# Patient Record
Sex: FEMALE | Race: BLACK OR AFRICAN AMERICAN | Employment: UNEMPLOYED | URBAN - METROPOLITAN AREA
[De-identification: names, ages, dates, MRNs, and addresses within clinical notes are randomized per-mention and may not be internally consistent; named-entity substitution may affect disease eponyms.]

---

## 2017-11-20 ENCOUNTER — OFFICE VISIT (OUTPATIENT)
Dept: INTERNAL MEDICINE CLINIC | Age: 57
End: 2017-11-20

## 2017-11-20 VITALS
HEART RATE: 85 BPM | TEMPERATURE: 98.5 F | DIASTOLIC BLOOD PRESSURE: 46 MMHG | SYSTOLIC BLOOD PRESSURE: 145 MMHG | RESPIRATION RATE: 16 BRPM | OXYGEN SATURATION: 97 %

## 2017-11-20 DIAGNOSIS — M79.7 FIBROMYALGIA: ICD-10-CM

## 2017-11-20 DIAGNOSIS — Z12.31 ENCOUNTER FOR SCREENING MAMMOGRAM FOR HIGH-RISK PATIENT: ICD-10-CM

## 2017-11-20 DIAGNOSIS — F31.9 BIPOLAR 1 DISORDER (HCC): ICD-10-CM

## 2017-11-20 DIAGNOSIS — M75.01 ADHESIVE CAPSULITIS OF RIGHT SHOULDER: Primary | ICD-10-CM

## 2017-11-20 DIAGNOSIS — I10 ESSENTIAL HYPERTENSION: ICD-10-CM

## 2017-11-20 DIAGNOSIS — Z00.00 PHYSICAL EXAM: ICD-10-CM

## 2017-11-20 DIAGNOSIS — K58.0 IRRITABLE BOWEL SYNDROME WITH DIARRHEA: ICD-10-CM

## 2017-11-20 RX ORDER — CHOLECALCIFEROL (VITAMIN D3) 125 MCG
220 CAPSULE ORAL AS NEEDED
COMMUNITY
End: 2017-11-26 | Stop reason: CLARIF

## 2017-11-20 RX ORDER — ALBUTEROL SULFATE 90 UG/1
2 AEROSOL, METERED RESPIRATORY (INHALATION)
COMMUNITY

## 2017-11-20 RX ORDER — MONTELUKAST SODIUM 10 MG/1
10 TABLET ORAL DAILY
COMMUNITY

## 2017-11-20 RX ORDER — AZELASTINE HCL 205.5 UG/1
SPRAY NASAL 2 TIMES DAILY
COMMUNITY

## 2017-11-20 RX ORDER — RANITIDINE 150 MG/1
150 TABLET, FILM COATED ORAL
COMMUNITY

## 2017-11-20 RX ORDER — GUAIFENESIN 100 MG/5ML
81 LIQUID (ML) ORAL DAILY
COMMUNITY

## 2017-11-20 RX ORDER — DICYCLOMINE HYDROCHLORIDE 10 MG/1
10 CAPSULE ORAL
COMMUNITY
End: 2018-01-23 | Stop reason: SDUPTHER

## 2017-11-20 RX ORDER — IBUPROFEN 800 MG/1
800 TABLET ORAL AS NEEDED
COMMUNITY
End: 2017-11-26 | Stop reason: CLARIF

## 2017-11-20 RX ORDER — BISMUTH SUBSALICYLATE 262 MG
1 TABLET,CHEWABLE ORAL DAILY
COMMUNITY

## 2017-11-20 RX ORDER — CHOLECALCIFEROL (VITAMIN D3) 125 MCG
CAPSULE ORAL
COMMUNITY

## 2017-11-20 RX ORDER — OMEPRAZOLE 40 MG/1
40 CAPSULE, DELAYED RELEASE ORAL DAILY
COMMUNITY

## 2017-11-20 NOTE — PROGRESS NOTES
Chief Complaint   Patient presents with    New Patient     patient refused weight and refused to discuss reason for visit. 1. Have you been to the ER, urgent care clinic since your last visit? Hospitalized since your last visit? No    2. Have you seen or consulted any other health care providers outside of the [address] [place] [address] since your last visit? Include any pap smears or colon screening.  Yes Where: patient from [place]

## 2017-11-20 NOTE — MR AVS SNAPSHOT
Visit Information Date & Time Provider Department Dept. Phone Encounter #  
 11/20/2017 11:30 AM Jese Owusu MD [facility] [phone] [id number] Upcoming Health Maintenance Date Due Hepatitis C Screening 1960 BREAST CANCER SCRN MAMMOGRAM 4/22/2010 FOBT Q 1 YEAR AGE 50-75 4/22/2010 DTaP/Tdap/Td series (2 - Td) 11/20/2027 Allergies as of 11/20/2017  Review Complete On: 11/20/2017 By: Ekaterina Garcia No Known Allergies Current Immunizations  Never Reviewed No immunizations on file. Not reviewed this visit You Were Diagnosed With   
  
 Codes Comments Adhesive capsulitis of right shoulder    -  Primary ICD-10-CM: M75.01 
ICD-9-CM: 726.0 Irritable bowel syndrome with diarrhea     ICD-10-CM: K58.0 ICD-9-CM: 048.5 Fibromyalgia     ICD-10-CM: M79.7 ICD-9-CM: 729.1 Essential hypertension     ICD-10-CM: I10 
ICD-9-CM: 401.9 Physical exam     ICD-10-CM: Z00.00 ICD-9-CM: V70.9 Vitals BP Pulse Temp Resp LMP SpO2  
 145/46 (BP 1 Location: Left arm, BP Patient Position: Sitting) 85 98.5 °F (36.9 °C) (Oral) 16 (Exact Date) 97% OB Status Smoking Status Hysterectomy Current Every Day Smoker Vitals History Preferred Pharmacy Pharmacy Name Phone [facility]/PH[facility]ACY #[id number] [phone] Your Updated Medication List  
  
   
This list is accurate as of: 11/20/17  2:39 PM.  Always use your most recent med list.  
  
  
  
  
 ALEVE 220 mg Cap Generic drug:  naproxen sodium Take 220 mg by mouth as needed. AMLODIPINE BESYLATE (BULK) 10 mg by Does Not Apply route daily. Azelastine 0.15 % (205.5 mcg) nasal spray Commonly known as:  ASTEPRO  
two (2) times a day. STEVE CHEWABLE ASPIRIN 81 mg chewable tablet Generic drug:  aspirin Take 81 mg by mouth daily. dicyclomine 10 mg capsule Commonly known as:  BENTYL Take 10 mg by mouth every seven (7) days. DULOXETINE HCL (BULK)  
by Does Not Apply route. GARCINIA CAMBOGIA PO Take 1 Tab by mouth daily. ibuprofen 800 mg tablet Commonly known as:  MOTRIN Take 800 mg by mouth as needed for Pain.  
  
 multivitamin tablet Commonly known as:  ONE A DAY Take 1 Tab by mouth daily. omeprazole 40 mg capsule Commonly known as:  PRILOSEC Take 40 mg by mouth daily. raNITIdine 150 mg tablet Commonly known as:  ZANTAC Take 150 mg by mouth daily as needed for Indigestion. SINGULAIR 10 mg tablet Generic drug:  montelukast  
Take 10 mg by mouth daily. valsartan 320 mg tab 320 mg, hydroCHLOROthiazide 25 mg tab 25 mg Take 1 Dose by mouth daily. VENTOLIN HFA 90 mcg/actuation inhaler Generic drug:  albuterol Take 2 Puffs by inhalation every four (4) hours as needed for Wheezing. VITAMIN D3 2,000 unit Tab Generic drug:  cholecalciferol (vitamin D3) Take  by mouth. zinc 50 mg Tab tablet Take  by mouth daily. We Performed the Following APOLIPOPROTEIN B [id number] CPT(R)] CBC WITH AUTOMATED DIFF [17150 CPT(R)] HEPATITIS C AB [30494 CPT(R)] LIPID PANEL [74140 CPT(R)] METABOLIC PANEL, COMPREHENSIVE [71689 CPT(R)] OCCULT BLOOD, IMMUNOASSAY (FIT) Q0099719 CPT(R)] IL COLLECTION VENOUS BLOOD,VENIPUNCTURE [id number] CPT(R)] TSH 3RD GENERATION [78214 CPT(R)] URINALYSIS W/ RFLX MICROSCOPIC [91381 CPT(R)] Introducing [facility] & HEALTH SERVICES! Ivon Salcedo introduces [employer] patient portal. Now you can access parts of your medical record, email your doctor's office, and request medication refills online. 1. In your internet browser, go to [email or web address] 2. Click on the First Time User? Click Here link in the Sign In box. You will see the New Member Sign Up page. 3. Enter your [employer] Access Code exactly as it appears below. You will not need to use this code after youve completed the sign-up process. If you do not sign up before the expiration date, you must request a new code. · [employer] Access Code: [email or web address] Expires: 2/18/2018  2:39 PM 
 
4. Enter the last four digits of your Social Security Number (xxxx) and Date of Birth (mm/dd/yyyy) as indicated and click Submit. You will be taken to the next sign-up page. 5. Create a [employer] ID. This will be your [employer] login ID and cannot be changed, so think of one that is secure and easy to remember. 6. Create a [employer] password. You can change your password at any time. 7. Enter your Password Reset Question and Answer. This can be used at a later time if you forget your password. 8. Enter your e-mail address. You will receive e-mail notification when new information is available in [address] 9. Click Sign Up. You can now view and download portions of your medical record. 10. Click the Download Summary menu link to download a portable copy of your medical information. If you have questions, please visit the Frequently Asked Questions section of the [employer] website. Remember, [employer] is NOT to be used for urgent needs. For medical emergencies, dial 911. Now available from your iPhone and Android! Please provide this summary of care documentation to your next provider. If you have any questions after today's visit, please call [phone].

## 2017-11-21 LAB
ALBUMIN SERPL-MCNC: 4.3 G/DL (ref 3.5–5.5)
ALBUMIN/GLOB SERPL: 1.6 {RATIO} (ref 1.2–2.2)
ALP SERPL-CCNC: 70 IU/L (ref 39–117)
ALT SERPL-CCNC: 11 IU/L (ref 0–32)
APO B SERPL-MCNC: 96 MG/DL (ref 54–133)
APPEARANCE UR: CLEAR
AST SERPL-CCNC: 17 IU/L (ref 0–40)
BASOPHILS # BLD AUTO: 0.1 X10E3/UL (ref 0–0.2)
BASOPHILS NFR BLD AUTO: 1 %
BILIRUB SERPL-MCNC: 0.3 MG/DL (ref 0–1.2)
BILIRUB UR QL STRIP: NEGATIVE
BUN SERPL-MCNC: 15 MG/DL (ref 6–24)
BUN/CREAT SERPL: 26 (ref 9–23)
CALCIUM SERPL-MCNC: 9.6 MG/DL (ref 8.7–10.2)
CHLORIDE SERPL-SCNC: 101 MMOL/L (ref 96–106)
CHOLEST SERPL-MCNC: 180 MG/DL (ref 100–199)
CO2 SERPL-SCNC: 25 MMOL/L (ref 18–29)
COLOR UR: YELLOW
CREAT SERPL-MCNC: 0.58 MG/DL (ref 0.57–1)
EOSINOPHIL # BLD AUTO: 0.1 X10E3/UL (ref 0–0.4)
EOSINOPHIL NFR BLD AUTO: 2 %
ERYTHROCYTE [DISTWIDTH] IN BLOOD BY AUTOMATED COUNT: 14.3 % (ref 12.3–15.4)
GFR SERPLBLD CREATININE-BSD FMLA CKD-EPI: 103 ML/MIN/1.73
GFR SERPLBLD CREATININE-BSD FMLA CKD-EPI: 118 ML/MIN/1.73
GLOBULIN SER CALC-MCNC: 2.7 G/DL (ref 1.5–4.5)
GLUCOSE SERPL-MCNC: 99 MG/DL (ref 65–99)
GLUCOSE UR QL: NEGATIVE
HCT VFR BLD AUTO: 35.3 % (ref 34–46.6)
HCV AB S/CO SERPL IA: <0.1 S/CO RATIO (ref 0–0.9)
HDLC SERPL-MCNC: 49 MG/DL
HGB BLD-MCNC: 11.2 G/DL (ref 11.1–15.9)
HGB UR QL STRIP: NEGATIVE
IMM GRANULOCYTES # BLD: 0 X10E3/UL (ref 0–0.1)
IMM GRANULOCYTES NFR BLD: 0 %
KETONES UR QL STRIP: NEGATIVE
LDLC SERPL CALC-MCNC: 101 MG/DL (ref 0–99)
LEUKOCYTE ESTERASE UR QL STRIP: NEGATIVE
LYMPHOCYTES # BLD AUTO: 2.6 X10E3/UL (ref 0.7–3.1)
LYMPHOCYTES NFR BLD AUTO: 42 %
MCH RBC QN AUTO: 28.9 PG (ref 26.6–33)
MCHC RBC AUTO-ENTMCNC: 31.7 G/DL (ref 31.5–35.7)
MCV RBC AUTO: 91 FL (ref 79–97)
MICRO URNS: NORMAL
MONOCYTES # BLD AUTO: 0.4 X10E3/UL (ref 0.1–0.9)
MONOCYTES NFR BLD AUTO: 6 %
NEUTROPHILS # BLD AUTO: 3 X10E3/UL (ref 1.4–7)
NEUTROPHILS NFR BLD AUTO: 49 %
NITRITE UR QL STRIP: NEGATIVE
PH UR STRIP: 6 [PH] (ref 5–7.5)
PLATELET # BLD AUTO: 374 X10E3/UL (ref 150–379)
POTASSIUM SERPL-SCNC: 4.5 MMOL/L (ref 3.5–5.2)
PROT SERPL-MCNC: 7 G/DL (ref 6–8.5)
PROT UR QL STRIP: NEGATIVE
RBC # BLD AUTO: 3.88 X10E6/UL (ref 3.77–5.28)
SODIUM SERPL-SCNC: 141 MMOL/L (ref 134–144)
SP GR UR: 1.03 (ref 1–1.03)
TRIGL SERPL-MCNC: 152 MG/DL (ref 0–149)
TSH SERPL DL<=0.005 MIU/L-ACNC: 1.16 UIU/ML (ref 0.45–4.5)
UROBILINOGEN UR STRIP-MCNC: 0.2 MG/DL (ref 0.2–1)
VLDLC SERPL CALC-MCNC: 30 MG/DL (ref 5–40)
WBC # BLD AUTO: 6.1 X10E3/UL (ref 3.4–10.8)

## 2017-11-26 PROBLEM — F31.9 BIPOLAR 1 DISORDER (HCC): Status: ACTIVE | Noted: 2017-11-26

## 2017-11-26 RX ORDER — MELOXICAM 15 MG/1
15 TABLET ORAL DAILY
Qty: 30 TAB | Refills: 1 | Status: SHIPPED | OUTPATIENT
Start: 2017-11-26

## 2017-11-26 NOTE — PROGRESS NOTES
[facility]  [place] [address]  [address] [place] [address]  Phone:  [phone]  Fax: [phone]       Chief Complaint   Patient presents with    New Patient     patient refused weight and refused to discuss reason for visit. .      SUBJECTIVE:    Fatmata Pelayo is a 62 y.o. female Comes in as a new patient. Her first complaint is that of pain in her right shoulder present since August of this year. This is aggravated with movement. There is no history of trauma. She has a history of hypertension for the last six years. She states that she is disabled as a direct result of irritable bowel syndrome. She has seen multiple gastroenterologists where she formerly lived. There is a history of fibromyalgia. She also states that she has a bipolar disorder and is seeing a psychiatrist.      She most recently moved to [place]. Current Outpatient Prescriptions   Medication Sig Dispense Refill    meloxicam (MOBIC) 15 mg tablet Take 1 Tab by mouth daily. 30 Tab 1    albuterol (VENTOLIN HFA) 90 mcg/actuation inhaler Take 2 Puffs by inhalation every four (4) hours as needed for Wheezing.  Azelastine (ASTEPRO) 0.15 % (205.5 mcg) nasal spray two (2) times a day.  zinc 50 mg tab tablet Take  by mouth daily.  cholecalciferol, vitamin D3, (VITAMIN D3) 2,000 unit tab Take  by mouth.  multivitamin (ONE A DAY) tablet Take 1 Tab by mouth daily.  dicyclomine (BENTYL) 10 mg capsule Take 10 mg by mouth every seven (7) days.  DULOXETINE HCL, BULK, by Does Not Apply route.  montelukast (SINGULAIR) 10 mg tablet Take 10 mg by mouth daily.  CHROM RAJEEV/BRINDAL BERRY (GARCINIA CAMBOGIA PO) Take 1 Tab by mouth daily.  aspirin (STEVE CHEWABLE ASPIRIN) 81 mg chewable tablet Take 81 mg by mouth daily.  raNITIdine (ZANTAC) 150 mg tablet Take 150 mg by mouth daily as needed for Indigestion.       omeprazole (PRILOSEC) 40 mg capsule Take 40 mg by mouth daily.  valsartan 320 mg tab 320 mg, hydroCHLOROthiazide 25 mg tab 25 mg Take 1 Dose by mouth daily.  AMLODIPINE BESYLATE, BULK, 10 mg by Does Not Apply route daily.        Past Medical History:   Diagnosis Date    Asthma     B[facility] depression ([facility]     Fibromyalgia     Hypertension     Diagnosed at age 46    IBS (irritable bowel syndrome)     Rheumatoid arthritis ([facility]      Past Surgical History:   Procedure Laterality Date    HX  SECTION      HX COLONOSCOPY      ×3 with the most recent one being less than 2 years ago    HX JYOTI AND BSO       No Known Allergies      REVIEW OF SYSTEMS:  General: negative for - chills or fever  ENT: negative for - headaches, nasal congestion or tinnitus  Respiratory: negative for - cough, hemoptysis, shortness of breath or wheezing  Cardiovascular : negative for - chest pain, edema, palpitations or shortness of breath  Gastrointestinal: negative for - abdominal pain, blood in stools, heartburn or nausea/vomiting  Genito-Urinary: no dysuria, trouble voiding, or hematuria  Musculoskeletal: negative for - gait disturbance, joint pain, joint stiffness or joint swelling  Neurological: no TIA or stroke symptoms  Hematologic: no bruises, no bleeding, no swollen glands  Integument: no lumps, mole changes, nail changes or rash  Endocrine: no malaise/lethargy or unexpected weight changes      Social History     Social History    Marital status: UNKNOWN     Spouse name: N/A    Number of children: N/A    Years of education: N/A     Social History Main Topics    Smoking status: Current Every Day Smoker    Smokeless tobacco: Never Used      Comment: \"smokes cigarettes per day\"    Alcohol use Yes    Drug use: Yes     Special: Marijuana    Sexual activity: No     Other Topics Concern    None     Social History Narrative     Family History   Problem Relation Age of Onset    Hypertension Mother     Hypertension Father     Cancer Father      History of multiple myeloma    Diabetes Sister        OBJECTIVE:    Visit Vitals    /46 (BP 1 Location: Left arm, BP Patient Position: Sitting)    Pulse 85    Temp 98.5 °F (36.9 °C) (Oral)    Resp 16    LMP  (Exact Date)  Comment: 2008    SpO2 97%     CONSTITUTIONAL: well , well nourished, appears age appropriate  EYES: perrla, eom intact  ENMT:moist mucous membranes, pharynx clear  NECK: supple. Thyroid normal  RESPIRATORY: Chest: clear to ascultation and percussion   CARDIOVASCULAR: Heart: regular rate and rhythm  GASTROINTESTINAL: Abdomen: soft, bowel sounds active  HEMATOLOGIC: no pathological lymph nodes palpated  MUSCULOSKELETAL: Extremities: no edema, pulse 1+   INTEGUMENT: No unusual rashes or suspicious skin lesions noted. Nails appear normal.  NEUROLOGIC: non-focal exam   MENTAL STATUS: alert and oriented, appropriate affect      ASSESSMENT:  1. Adhesive capsulitis of right shoulder    2. Irritable bowel syndrome with diarrhea    3. Fibromyalgia    4. Essential hypertension    5. Encounter for screening mammogram for high-risk patient    6. Bipolar 1 disorder ([facility]    7. Physical exam        PLAN:    1. The patient appears to have a pericapsulitis of her right shoulder. I suggested NSAID usage chronically at least for the next month. If no improvement, she will have to see an orthopaedic physician where an injection of the shoulder would be in order. 2. She has irritable bowel syndrome and needs to follow-up with a gastroenterologist.   3. The fibromyalgia appears to be reasonably stable for now. 4. Blood pressure is 144/78, no adjustments are made for now but will be done on a return visit if this persists. 5. As far as her bipolar disorder is concerned, follow-up with psychiatrist is indicated. 6. Mammograms will be scheduled.         .  Orders Placed This Encounter    NUNO MAMMO BI SCREENING INCL CAD    HEPATITIS C AB    OCCULT BLOOD, IMMUNOASSAY (FIT)    APOLIPOPROTEIN B    CBC WITH AUTOMATED DIFF    LIPID PANEL    URINALYSIS W/ RFLX MICROSCOPIC    TSH 3RD GENERATION    METABOLIC PANEL, COMPREHENSIVE    albuterol (VENTOLIN HFA) 90 mcg/actuation inhaler    Azelastine (ASTEPRO) 0.15 % (205.5 mcg) nasal spray    zinc 50 mg tab tablet    cholecalciferol, vitamin D3, (VITAMIN D3) 2,000 unit tab    multivitamin (ONE A DAY) tablet    dicyclomine (BENTYL) 10 mg capsule    DULOXETINE HCL, BULK,    montelukast (SINGULAIR) 10 mg tablet    CHROM RAJEEV/BRINDAL BERRY (GARCINIA CAMBOGIA PO)    aspirin (STEVE CHEWABLE ASPIRIN) 81 mg chewable tablet    DISCONTD: naproxen sodium (ALEVE) 220 mg cap    DISCONTD: ibuprofen (MOTRIN) 800 mg tablet    raNITIdine (ZANTAC) 150 mg tablet    omeprazole (PRILOSEC) 40 mg capsule    valsartan 320 mg tab 320 mg, hydroCHLOROthiazide 25 mg tab 25 mg    AMLODIPINE BESYLATE, BULK,    meloxicam (MOBIC) 15 mg tablet         Follow-up Disposition:  Return in about 4 weeks (around 12/18/2017).       Gabby Cisneros MD

## 2017-12-04 RX ORDER — VALSARTAN AND HYDROCHLOROTHIAZIDE 320; 25 MG/1; MG/1
1 TABLET, FILM COATED ORAL DAILY
Qty: 30 TAB | Refills: 11 | Status: SHIPPED | OUTPATIENT
Start: 2017-12-04

## 2017-12-04 RX ORDER — AMLODIPINE BESYLATE 10 MG/1
10 TABLET ORAL DAILY
Qty: 30 TAB | Refills: 11 | Status: SHIPPED | OUTPATIENT
Start: 2017-12-04

## 2017-12-12 LAB — HEMOCCULT STL QL IA: NORMAL

## 2017-12-20 ENCOUNTER — HOSPITAL ENCOUNTER (OUTPATIENT)
Dept: MAMMOGRAPHY | Age: 57
Discharge: HOME | End: 2017-12-20
Payer: MEDICAID

## 2017-12-20 DIAGNOSIS — Z12.31 ENCOUNTER FOR SCREENING MAMMOGRAM FOR HIGH-RISK PATIENT: ICD-10-CM

## 2017-12-20 PROCEDURE — 77067 SCR MAMMO BI INCL CAD: CPT

## 2018-01-09 ENCOUNTER — OFFICE VISIT (OUTPATIENT)
Dept: INTERNAL MEDICINE CLINIC | Age: 58
End: 2018-01-09

## 2018-01-09 VITALS
OXYGEN SATURATION: 95 % | BODY MASS INDEX: 27.36 KG/M2 | RESPIRATION RATE: 16 BRPM | DIASTOLIC BLOOD PRESSURE: 83 MMHG | SYSTOLIC BLOOD PRESSURE: 146 MMHG | TEMPERATURE: 98.5 F | WEIGHT: 174.3 LBS | HEART RATE: 92 BPM | HEIGHT: 67 IN

## 2018-01-09 DIAGNOSIS — M75.01 ADHESIVE CAPSULITIS OF RIGHT SHOULDER: ICD-10-CM

## 2018-01-09 DIAGNOSIS — I10 ESSENTIAL HYPERTENSION: ICD-10-CM

## 2018-01-09 DIAGNOSIS — Z72.0 TOBACCO ABUSE: ICD-10-CM

## 2018-01-09 DIAGNOSIS — F31.9 BIPOLAR 1 DISORDER (HCC): ICD-10-CM

## 2018-01-09 DIAGNOSIS — M79.7 FIBROMYALGIA: ICD-10-CM

## 2018-01-09 DIAGNOSIS — K58.0 IRRITABLE BOWEL SYNDROME WITH DIARRHEA: Primary | ICD-10-CM

## 2018-01-09 NOTE — PROGRESS NOTES
Chief Complaint   Patient presents with    Irritable Bowel Syndrome     1 month follow up      1. Have you been to the ER, urgent care clinic since your last visit? Hospitalized since your last visit? No    2. Have you seen or consulted any other health care providers outside of the [address] [place] [address] since your last visit? Include any pap smears or colon screening.  No

## 2018-01-09 NOTE — MR AVS SNAPSHOT
Visit Information Date & Time Provider Department Dept. Phone Encounter #  
 1/9/2018  3:30 PM [facility] [phone] [id number] Upcoming Health Maintenance Date Due FOBT Q 1 YEAR AGE 50-75 11/20/2018 BREAST CANCER SCRN MAMMOGRAM 11/26/2019 DTaP/Tdap/Td series (2 - Td) 11/20/2027 Allergies as of 1/9/2018  Review Complete On: 1/9/2018 By: Rubi Combs No Known Allergies Current Immunizations  Never Reviewed No immunizations on file. Not reviewed this visit You Were Diagnosed With   
  
 Codes Comments Irritable bowel syndrome with diarrhea    -  Primary ICD-10-CM: K58.0 ICD-9-CM: 253.8 Essential hypertension     ICD-10-CM: I10 
ICD-9-CM: 401.9 Bipolar 1 disorder (HCC)     ICD-10-CM: F31.9 ICD-9-CM: 296.7 Tobacco abuse     ICD-10-CM: Z72.0 ICD-9-CM: 305.1 Adhesive capsulitis of right shoulder     ICD-10-CM: M75.01 
ICD-9-CM: 726.0 Vitals BP Pulse Temp Resp Height(growth percentile) Weight(growth percentile) 146/83 (BP 1 Location: Left arm, BP Patient Position: Sitting) 92 98.5 °F (36.9 °C) (Oral) 16 5' 6.5\" (1.689 m) 174 lb 4.8 oz (79.1 kg) SpO2 BMI OB Status Smoking Status 95% 27.71 kg/m2 Hysterectomy Current Every Day Smoker Vitals History BMI and BSA Data Body Mass Index Body Surface Area  
 27.71 kg/m 2 1.93 m 2 Preferred Pharmacy Pharmacy Name Phone [facility]/PHARMACY #[id number] [address] [phone] Your Updated Medication List  
  
   
This list is accurate as of: 1/9/18  4:09 PM.  Always use your most recent med list. amLODIPine 10 mg tablet Commonly known as:  Ellie Martin Take 1 Tab by mouth daily. Azelastine 0.15 % (205.5 mcg) nasal spray Commonly known as:  ASTEPRO  
two (2) times a day. STEVE CHEWABLE ASPIRIN 81 mg chewable tablet Generic drug:  aspirin Take 81 mg by mouth daily. dicyclomine 10 mg capsule Commonly known as:  BENTYL Take 10 mg by mouth every seven (7) days. DULOXETINE HCL (BULK)  
by Does Not Apply route. GARCINIA CAMBOGIA PO Take 1 Tab by mouth daily. meloxicam 15 mg tablet Commonly known as:  MOBIC Take 1 Tab by mouth daily. multivitamin tablet Commonly known as:  ONE A DAY Take 1 Tab by mouth daily. omeprazole 40 mg capsule Commonly known as:  PRILOSEC Take 40 mg by mouth daily. raNITIdine 150 mg tablet Commonly known as:  ZANTAC Take 150 mg by mouth daily as needed for Indigestion. SINGULAIR 10 mg tablet Generic drug:  montelukast  
Take 10 mg by mouth daily. valsartan-hydroCHLOROthiazide 320-25 mg per tablet Commonly known as:  DIOVAN-HCT Take 1 Tab by mouth daily. VENTOLIN HFA 90 mcg/actuation inhaler Generic drug:  albuterol Take 2 Puffs by inhalation every four (4) hours as needed for Wheezing. VITAMIN D3 2,000 unit Tab Generic drug:  cholecalciferol (vitamin D3) Take  by mouth. zinc 50 mg Tab tablet Take  by mouth daily. Introducing [facility] & HEALTH [facility]! Cleo Bennett introduces [employer] patient portal. Now you can access parts of your medical record, email your doctor's office, and request medication refills online. 1. In your internet browser, go to [email or web address] 2. Click on the First Time User? Click Here link in the Sign In box. You will see the New Member Sign Up page. 3. Enter your [employer] Access Code exactly as it appears below. You will not need to use this code after youve completed the sign-up process. If you do not sign up before the expiration date, you must request a new code. · [employer] Access Code: [email or web address] Expires: 2/18/2018  2:39 PM 
 
 4. Enter the last four digits of your Social Security Number (xxxx) and Date of Birth (mm/dd/yyyy) as indicated and click Submit. You will be taken to the next sign-up page. 5. Create a [employer] ID. This will be your [employer] login ID and cannot be changed, so think of one that is secure and easy to remember. 6. Create a [employer] password. You can change your password at any time. 7. Enter your Password Reset Question and Answer. This can be used at a later time if you forget your password. 8. Enter your e-mail address. You will receive e-mail notification when new information is available in [address] 9. Click Sign Up. You can now view and download portions of your medical record. 10. Click the Download Summary menu link to download a portable copy of your medical information. If you have questions, please visit the Frequently Asked Questions section of the [employer] website. Remember, [employer] is NOT to be used for urgent needs. For medical emergencies, dial 911. Now available from your iPhone and Android! Please provide this summary of care documentation to your next provider. Your primary care clinician is listed as Jimenez Cordova. If you have any questions after today's visit, please call [phone].

## 2018-01-10 ENCOUNTER — TELEPHONE (OUTPATIENT)
Dept: INTERNAL MEDICINE CLINIC | Age: 58
End: 2018-01-10

## 2018-01-10 NOTE — TELEPHONE ENCOUNTER
Spoke with patient to inform her that Dr. Svetlana Metz recommends that she take both blood pressure medications. Patient verbalized understanding. I told patient that she could give the office a call if she has any questions.

## 2018-01-10 NOTE — PROGRESS NOTES
[facility]  [facility]  [address] [place] [address]  Phone:  [phone]  Fax: [phone]       Chief Complaint   Patient presents with    Irritable Bowel Syndrome     1 month follow up    . SUBJECTIVE:    Arabella christensen a 62 y.o. female comes in for return visit stating that her right shoulder is somewhat better. I gave her Meloxicam on the last visit. This appears to have helped although not completely. She is more comfortable. The sensation is aggravated with certain positions. She continues to have fibromyalgia but this is not disabling. She does not really want any analgesics. She has not been consistently taking her antihypertensive medication on a regular basis. She has IBS with diarrhea. This is rather disabling. She would like to see a psychiatrist also for her bipolar disorder. Unfortunately, she continues to smoke cigarettes. Current Outpatient Prescriptions   Medication Sig Dispense Refill    eluxadoline (VIBERZI) 100 mg tablet Take 1 Tab by mouth two (2) times daily (with meals). Max Daily Amount: 200 mg. 60 Tab 11    valsartan-hydroCHLOROthiazide (DIOVAN-HCT) 320-25 mg per tablet Take 1 Tab by mouth daily. 30 Tab 11    amLODIPine (NORVASC) 10 mg tablet Take 1 Tab by mouth daily. 30 Tab 11    meloxicam (MOBIC) 15 mg tablet Take 1 Tab by mouth daily. 30 Tab 1    albuterol (VENTOLIN HFA) 90 mcg/actuation inhaler Take 2 Puffs by inhalation every four (4) hours as needed for Wheezing.  Azelastine (ASTEPRO) 0.15 % (205.5 mcg) nasal spray two (2) times a day.  zinc 50 mg tab tablet Take  by mouth daily.  cholecalciferol, vitamin D3, (VITAMIN D3) 2,000 unit tab Take  by mouth.  multivitamin (ONE A DAY) tablet Take 1 Tab by mouth daily.  dicyclomine (BENTYL) 10 mg capsule Take 10 mg by mouth every seven (7) days.  DULOXETINE HCL, BULK, by Does Not Apply route.       montelukast (SINGULAIR) 10 mg tablet Take 10 mg by mouth daily.  CHROM RAJEEV/BRINDAL BERRY (GARCINIA CAMBOGIA PO) Take 1 Tab by mouth daily.  aspirin (STEVE CHEWABLE ASPIRIN) 81 mg chewable tablet Take 81 mg by mouth daily.  raNITIdine (ZANTAC) 150 mg tablet Take 150 mg by mouth daily as needed for Indigestion.  omeprazole (PRILOSEC) 40 mg capsule Take 40 mg by mouth daily.        Past Medical History:   Diagnosis Date    Asthma     Bipolar depression ([facility]     Fibromyalgia     Hypertension     Diagnosed at age 46    IBS (irritable bowel syndrome)     Rheumatoid arthritis ([facility]      Past Surgical History:   Procedure Laterality Date    HX  SECTION      HX COLONOSCOPY      ×3 with the most recent one being less than 2 years ago    HX JYOTI AND BSO       No Known Allergies      REVIEW OF SYSTEMS:  General: negative for - chills or fever  ENT: negative for - headaches, nasal congestion or tinnitus  Respiratory: negative for - cough, hemoptysis, shortness of breath or wheezing  Cardiovascular : negative for - chest pain, edema, palpitations or shortness of breath  Gastrointestinal: negative for - abdominal pain, blood in stools, heartburn or nausea/vomiting  Genito-Urinary: no dysuria, trouble voiding, or hematuria  Musculoskeletal: negative for - gait disturbance, joint pain, joint stiffness or joint swelling  Neurological: no TIA or stroke symptoms  Hematologic: no bruises, no bleeding, no swollen glands  Integument: no lumps, mole changes, nail changes or rash  Endocrine: no malaise/lethargy or unexpected weight changes      Social History     Social History    Marital status: UNKNOWN     Spouse name: N/A    Number of children: N/A    Years of education: N/A     Social History Main Topics    Smoking status: Current Every Day Smoker    Smokeless tobacco: Never Used      Comment: \"smokes cigarettes per day\"    Alcohol use Yes    Drug use: Yes     Special: Marijuana    Sexual activity: No     Other Topics Concern    None     Social History Narrative     Family History   Problem Relation Age of Onset    Hypertension Mother     Hypertension Father     Cancer Father      History of multiple myeloma    Diabetes Sister        OBJECTIVE:    Visit Vitals    /83 (BP 1 Location: Left arm, BP Patient Position: Sitting)    Pulse 92    Temp 98.5 °F (36.9 °C) (Oral)    Resp 16    Ht 5' 6.5\" (1.689 m)    Wt 174 lb 4.8 oz (79.1 kg)    SpO2 95%    BMI 27.71 kg/m2     CONSTITUTIONAL: well , well nourished, appears age appropriate  EYES: perrla, eom intact  ENMT:moist mucous membranes, pharynx clear  NECK: supple. Thyroid normal  RESPIRATORY: Chest: clear to ascultation and percussion   CARDIOVASCULAR: Heart: regular rate and rhythm  GASTROINTESTINAL: Abdomen: soft, bowel sounds active  HEMATOLOGIC: no pathological lymph nodes palpated  MUSCULOSKELETAL: Extremities: no edema, pulse 1+   INTEGUMENT: No unusual rashes or suspicious skin lesions noted. Nails appear normal.  NEUROLOGIC: non-focal exam   MENTAL STATUS: alert and oriented, appropriate affect      ASSESSMENT:  1. Irritable bowel syndrome with diarrhea    2. Essential hypertension    3. Bipolar 1 disorder ([facility]    4. Tobacco abuse    5. Adhesive capsulitis of right shoulder    6. Fibromyalgia      Diagnoses and all orders for this visit:    1. Irritable bowel syndrome with diarrhea  -     eluxadoline (VIBERZI) 100 mg tablet; Take 1 Tab by mouth two (2) times daily (with meals). Max Daily Amount: 200 mg.    2. Essential hypertension    3. Bipolar 1 disorder [facility])  Assessment & Plan: This condition is managed by Specialist.  Key Psychotherapeutic Meds     Patient is on no psychotherapeutic meds. Other [address] [place] [address]     The patient is on no other behavioral health meds.         Lab Results   Component Value Date/Time    Sodium 141 11/20/2017 02:25 PM    Creatinine 0.58 11/20/2017 02:25 PM    TSH 1.160 11/20/2017 02:25 PM WBC 6.1 11/20/2017 02:25 PM    ALT (SGPT) 11 11/20/2017 02:25 PM    AST (SGOT) 17 11/20/2017 02:25 PM       Orders:  -     REFERRAL TO PSYCHIATRY    4. Tobacco abuse    5. Adhesive capsulitis of right shoulder    6. Fibromyalgia        PLAN:    1. As far as her irritable bowel syndrome is concerned, I will start her on Viberzi. This should also help with the diarrhea. If no improvement, she will have to see a gastroenterologist.   2. Her blood pressure is elevated. She was placed on two different antihypertensive medications. I will continue both for now. 3. Her bipolar disorder will be evaluated by a psychiatrist.   4. I encouraged her to discontinue cigarette smoking. 5. She does have a mild pericapsulitis of her right shoulder but this is improving. 6. As far as the fibromyalgia is concerned, she is really not interested in pursuing any treatment at this point. She realizes she will have to just live with it because she understands there is no cure. 7. I encouraged her to increase her physical activity. .  Orders Placed This Encounter    REFERRAL TO PSYCHIATRY    eluxadoline (VIBERZI) 100 mg tablet         Follow-up Disposition:  Return in about 6 weeks (around 2/20/2018).       Francine White MD

## 2018-01-24 RX ORDER — DICYCLOMINE HYDROCHLORIDE 10 MG/1
10 CAPSULE ORAL
Qty: 60 CAP | Refills: 2 | Status: SHIPPED | OUTPATIENT
Start: 2018-01-24

## 2019-09-24 PROBLEM — Z00.00 PHYSICAL EXAM: Status: RESOLVED | Noted: 2017-11-20 | Resolved: 2019-09-24

## 2020-11-20 NOTE — ASSESSMENT & PLAN NOTE
This condition is managed by Specialist.  Key Psychotherapeutic Meds     Patient is on no psychotherapeutic meds. Other [address] [place] [address]     The patient is on no other behavioral health meds.         Lab Results   Component Value Date/Time    Sodium 141 11/20/2017 02:25 PM    Creatinine 0.58 11/20/2017 02:25 PM    TSH 1.160 11/20/2017 02:25 PM    WBC 6.1 11/20/2017 02:25 PM    ALT (SGPT) 11 11/20/2017 02:25 PM    AST (SGOT) 17 11/20/2017 02:25 PM Patient

## 2022-03-18 PROBLEM — M75.01 ADHESIVE CAPSULITIS OF RIGHT SHOULDER: Status: ACTIVE | Noted: 2017-11-20

## 2022-03-18 PROBLEM — K58.0 IRRITABLE BOWEL SYNDROME WITH DIARRHEA: Status: ACTIVE | Noted: 2017-11-20

## 2022-03-18 PROBLEM — M79.7 FIBROMYALGIA: Status: ACTIVE | Noted: 2017-11-20

## 2022-03-18 PROBLEM — Z72.0 TOBACCO ABUSE: Status: ACTIVE | Noted: 2018-01-09

## 2022-03-18 PROBLEM — I10 ESSENTIAL HYPERTENSION: Status: ACTIVE | Noted: 2017-11-20

## 2022-03-20 PROBLEM — F31.9 BIPOLAR 1 DISORDER (HCC): Status: ACTIVE | Noted: 2017-11-26

## 2023-05-24 RX ORDER — RANITIDINE 150 MG/1
150 TABLET ORAL DAILY PRN
COMMUNITY

## 2023-05-24 RX ORDER — AMLODIPINE BESYLATE 10 MG/1
10 TABLET ORAL DAILY
COMMUNITY
Start: 2017-12-04

## 2023-05-24 RX ORDER — MELOXICAM 15 MG/1
15 TABLET ORAL DAILY
COMMUNITY
Start: 2017-11-26

## 2023-05-24 RX ORDER — DICYCLOMINE HYDROCHLORIDE 10 MG/1
10 CAPSULE ORAL 4 TIMES DAILY PRN
COMMUNITY
Start: 2018-01-24

## 2023-05-24 RX ORDER — AZELASTINE HCL 205.5 UG/1
SPRAY NASAL 2 TIMES DAILY
COMMUNITY

## 2023-05-24 RX ORDER — ASPIRIN 81 MG/1
81 TABLET, CHEWABLE ORAL DAILY
COMMUNITY

## 2023-05-24 RX ORDER — MONTELUKAST SODIUM 10 MG/1
10 TABLET ORAL DAILY
COMMUNITY

## 2023-05-24 RX ORDER — ALBUTEROL SULFATE 90 UG/1
2 AEROSOL, METERED RESPIRATORY (INHALATION) EVERY 4 HOURS PRN
COMMUNITY

## 2023-05-24 RX ORDER — VALSARTAN AND HYDROCHLOROTHIAZIDE 320; 25 MG/1; MG/1
1 TABLET, FILM COATED ORAL DAILY
COMMUNITY
Start: 2017-12-04

## 2023-05-24 RX ORDER — OMEPRAZOLE 40 MG/1
40 CAPSULE, DELAYED RELEASE ORAL DAILY
COMMUNITY